# Patient Record
Sex: MALE | Race: OTHER | URBAN - METROPOLITAN AREA
[De-identification: names, ages, dates, MRNs, and addresses within clinical notes are randomized per-mention and may not be internally consistent; named-entity substitution may affect disease eponyms.]

---

## 2018-08-11 ENCOUNTER — OFFICE VISIT (OUTPATIENT)
Dept: FAMILY MEDICINE CLINIC | Age: 48
End: 2018-08-11

## 2018-08-11 VITALS
BODY MASS INDEX: 28.53 KG/M2 | TEMPERATURE: 98.6 F | WEIGHT: 161 LBS | SYSTOLIC BLOOD PRESSURE: 156 MMHG | HEART RATE: 82 BPM | HEIGHT: 63 IN | DIASTOLIC BLOOD PRESSURE: 89 MMHG

## 2018-08-11 DIAGNOSIS — Z13.9 ENCOUNTER FOR SCREENING: ICD-10-CM

## 2018-08-11 DIAGNOSIS — I10 ESSENTIAL HYPERTENSION: Primary | ICD-10-CM

## 2018-08-11 DIAGNOSIS — H66.003 ACUTE SUPPURATIVE OTITIS MEDIA OF BOTH EARS WITHOUT SPONTANEOUS RUPTURE OF TYMPANIC MEMBRANES, RECURRENCE NOT SPECIFIED: ICD-10-CM

## 2018-08-11 LAB — GLUCOSE POC: NORMAL MG/DL

## 2018-08-11 RX ORDER — AMLODIPINE BESYLATE 5 MG/1
5 TABLET ORAL DAILY
Qty: 90 TAB | Refills: 1 | Status: SHIPPED | OUTPATIENT
Start: 2018-08-11

## 2018-08-11 RX ORDER — AMOXICILLIN 875 MG/1
875 TABLET, FILM COATED ORAL 2 TIMES DAILY
Qty: 20 TAB | Refills: 0 | Status: SHIPPED | OUTPATIENT
Start: 2018-08-11 | End: 2018-08-21

## 2018-08-11 NOTE — PATIENT INSTRUCTIONS
Dieta DASH: Instrucciones de cuidado - [ DASH Diet: Care Instructions ]  Instrucciones de cuidado    La dieta DASH es un plan de alimentación que puede ayudar a bajar la presión arterial. DASH es la sigla en inglés de \"Dietary Approaches to Stop Hypertension\" (medidas dietéticas para disminuir la hipertensión). Hipertensión significa presión arterial don. La dieta DASH se concentra en el consumo de alimentos con alto contenido de calcio, potasio y [place]. Estos nutrientes pueden disminuir la presión arterial. Los alimentos con el mayor contenido de [place] nutrientes son las frutas, las verduras, los productos lácteos de bajo contenido de [place], las nueces, las semillas y las legumbres. Imelda beth suplementos de calcio, potasio y magnesio, en lugar de comer alimentos ricos en estos nutrientes, no tiene el mismo efecto. La dieta DASH también incluye granos integrales, pescado y aves. La dieta DASH es maciel de los cambios de estilo de joana que quizá le recomiende best médico para reducir la presión arterial don. Es posible que best médico también quiera que usted reduzca la cantidad de sodio en best [place]. Reducir el sodio mientras sigue la dieta DASH puede bajar la presión arterial incluso más que únicamente con la dieta DASH. La atención de seguimiento es cornelio parte clave de best tratamiento y seguridad. Asegúrese de hacer y acudir a todas las citas, y llame a best médico si está teniendo problemas. También es cornelio buena idea saber los resultados de chanelle exámenes y mantener cornelio lista de los medicamentos que catherine. ¿Cómo puede cuidarse en el hogar? Cómo seguir la dieta DASH  · Coma entre 4 y 5 porciones de fruta al día. Cornelio porción incluye 1 fruta de [place], ½ taza de fruta enlatada o cortada en trozos, 1/4 taza de fruta seca o 4 onzas (½ taza) de jugo de frutas. Elija fruta con más frecuencia que jugo. · Consuma entre 4 y 11 porciones de verduras al día.  Cornelio porción incluye 1 taza de [address] verduras de hojas crudas, ½ taza de verduras cocidas o cortadas en trozos, o 4 onzas (½ taza) de jugo de verduras. Elija comer verduras con más frecuencia que beth best jugo. · Consuma entre 2 y 3 porciones de lácteos semidescremados y descremados al día. Cornelio porción incluye 8 onzas de [place], 1 taza de yogur o 1½ onzas de [place]. · Coma entre 6 y 6 porciones de granos todos los [address] Cornelio porción incluye 1 rebanada de pan, 1 onza de cereal seco, o ½ taza de arroz, pasta o cereal cocido. Trate de elegir productos de grano integral con la mayor frecuencia posible. · Limite la cantidad de [place] aves y pescado a 2 porciones al día. Ru Arlene porción son 3 onzas, lo que es aproximadamente el tamaño de un rolando de naipes. · Coma entre 4 y 5 porciones de nueces, semillas y legumbres (frijoles [habichuelas], lentejas y arvejas [chícharos] secos y cocidos) a la semana. Cornelio porción incluye 1/3 taza de nueces, 2 cucharadas de semillas o ½ taza de frijoles o arvejas cocidos. · [address] y aceites a 2 o 3 porciones al día. Cornelio porción es 1 cucharadita de aceite vegetal o 2 cucharadas de aderezo para ensaladas. · Limite los dulces y el azúcar adicional a 5 porciones o menos por semana. Ru Arlene porción es 1 cucharada de [place], ½ taza de sorbete o 1 taza de limonada. · Consuma menos de 2,300 miligramos (mg) de sodio al día. Si limita best consumo de sodio a 1,500 mg al día, puede reducir best presión arterial aún más. Consejos para tener éxito  · Inicie con cambios pequeños. No intente hacer cambios radicales en best dieta de manera repentina. Podría sentir que extraña chanelle comidas favoritas y, por lo [place], venkatesh cornelio mayor probabilidad de que no cumpla el plan. Mary Alice Dejesus. Gemini Bradshawese que esos cambios se conviertan en un hábito, incorpore algunos [employer]. · Pruebe algo de lo siguiente:  ¨ Fíjese el objetivo de comer cornelio porción de fruta o de verduras en todas las comidas y los refrigerios.  Beatrice facilitará alcanzar la cantidad diaria recomendada de frutas y verduras. ¨ Pruebe comer yogur cubierto con frutas frescas y nueces dimitri refrigerio o dimitri postre saludable. ¨ Agregue jade, tomate, pepino y cebolla a chanelle sándwiches. ¨ Combine cornelio masa de pizza precocida con queso mozzarella de bajo contenido de grasa (\"low-fat\") y cúbralo con abundantes verduras. Intente utilizar tomates, calabaza, espinaca, brócoli, zanahorias, coliflor y cebollas. ¨ Opte por comer cornelio variedad de verduras cortadas en trozos con un aderezo de bajo contenido de grasa dimitri refrigerio, en lugar de \"chips\" (tipo meg fritas) y un \"dip\" (producto untable). ¨ [place] semillas de girasol o almendras picadas [employer]. O intente agregar nueces o almendras picadas a las verduras cocidas. ¨ Pruebe algunas comidas vegetarianas con frijoles y arvejas. [place] o frijoles rojos a las ensaladas. Prepare burritos y tacos con puré de frijoles pintos o negros. ¿Dónde puede encontrar más información en inglés? Derrick Pettit a [email or web address]-glenroy.info/. [place] L767 en la búsqueda para aprender más acerca de \"Dieta DASH: Instrucciones de cuidado - [ DASH Diet: Care Instructions ]. \"  Revisado: 6 dicrufinambre, 2017  Versión del contenido: 11.7  © 7086-4397 [employer]. Las instrucciones de cuidado fueron adaptadas bajo licencia por [employer] (which disclaims liability or warranty for this information). Si usted tiene [place] afección médica o sobre estas instrucciones, siempre pregunte a best profesional de rafqi. [facility] niega toda garantía o responsabilidad por best uso de esta información. Infección del oído (otitis media): Instrucciones de cuidado - [ Ear Infection (Otitis Media): Care Instructions ]  Instrucciones de cuidado    Cornelio infección de oído podría comenzar con un resfriado y afectar el oído medio (otitis media). Puede doler mucho.  [place] Beat de las infecciones de oído sanan por sí solas en un par de días. A menudo, no se necesitan antibióticos. La razón es que muchas infecciones de oído están causadas por virus. Los antibióticos no funcionan contra los virus. Las dosis regulares de analgésicos (medicamentos para el dolor) son la mejor manera de reducir la fiebre y ayudarle a sentirse mejor. La atención de seguimiento es cornelio parte clave de akbar tratamiento y seguridad. Asegúrese de hacer y acudir a todas las citas, y llame a akbar médico si está teniendo problemas. También es cornelio buena idea saber los resultados de chanelle exámenes y mantener cornelio lista de los medicamentos que catherine. ¿Cómo puede cuidarse en el hogar? · [employer] analgésicos exactamente dimitri le fueron indicados. ¨ Si el médico le recetó un analgésico, tómelo según las indicaciones. ¨ Si no está tomando un analgésico recetado, tome maciel de venta keshia, dimitri acetaminofén (Tylenol), ibuprofeno (Advil, Motrin) o naproxeno (Aleve). Tierney y siga todas las instrucciones de la [place]. ¨ No tome dos o más analgésicos al mismo tiempo a menos que el médico se lo haya indicado. Muchos analgésicos contienen acetaminofén, es decir, Tylenol. El exceso de acetaminofén (Tylenol) puede ser dañino. · Planee tomarse cornelio dosis completa de analgésico antes de acostarse. Dormir lo suficiente le ayudará a sentirse mejor. · Pruebe con cornelio toallita tibia húmeda en el oído. Reginald vez le ayude a aliviar el dolor. · Si akbar médico le recetó antibióticos, tómelos según las indicaciones. No deje de tomarlos por el hecho de sentirse mejor. Debe beth todos los antibióticos hasta terminarlos. ¿Cuándo debe pedir ayuda?   Llame a akbar médico ahora mismo o busque atención médica inmediata si:    · Tiene dolor de oído nuevo o que aumenta.     · Akbar oído presenta secreción de pus o adriana nueva o que está aumentando.     · Tiene fiebre junto con rigidez en el shawna o un dolor de gricelda intenso.    Preste especial atención a los cambios en best rafiq y asegúrese de comunicarse con best médico si:    · Tiene síntomas nuevos o que empeoran.     · No mejora después de venkatesh tomado un antibiótico bakari 2 días. ¿Dónde puede encontrar más información en inglés? Tiarra López a http://kevyn-glenroy.[email or web address] Hever Gip en la búsqueda para aprender más acerca de \"Infección del oído (otitis media): Instrucciones de cuidado - [ Ear Infection (Otitis Media): Care Instructions ]. \"  Revisado: 12 ying, 2017  Versión del contenido: 11.7  © 4505-0578 [employer]wise, Incorporated. Las instrucciones de cuidado fueron adaptadas bajo licencia por [employer] (which disclaims liability or warranty for this information). Si usted tiene [place] afección médica o sobre estas instrucciones, siempre pregunte a best profesional de rafiq. [employer], Incorporated niega toda garantía o responsabilidad por best uso de esta información.

## 2018-08-11 NOTE — PROGRESS NOTES
Patricia Andrews is a 52 y.o. male    Issues discussed today include:    Chief Complaint   Patient presents with    Other     Blood Pressure and diabetes.  Physical       1) Abnormal BP:  Was sent from his work after physical revealed high BP and sugar in the urine. They were also concerned about ear infections. Pt brought paper from them indicating all of the above. Pt does endorse some pain and itching in both ears. He denies CP, SOB, leg swelling. No previous dx of HTN or DM. Says he father passed away in [facility] in March 2018 and he has been feeling sad, distracted, stressed since then. Works in cement, lots of dust.   ot taking any meds currently. No tobacco use. Drinks on the weekends. Data reviewed or ordered today:       Other problems include: There is no problem list on file for this patient. Medications:  No current outpatient prescriptions on file prior to visit. No current facility-administered medications on file prior to visit. Allergies:  No Known Allergies    LMP:  No LMP for male patient. Social History     Social History    Marital status:      Spouse name: N/A    Number of children: N/A    Years of education: N/A     Occupational History    Not on file. Social History Main Topics    Smoking status: Former Smoker    Smokeless tobacco: Never Used    Alcohol use 7.2 oz/week     12 Cans of beer per week      Comment: on the weekends    Drug use: No    Sexual activity: Not on file     Other Topics Concern    Not on file     Social History Narrative    No narrative on file       No family history on file.       Physical Exam   Visit Vitals    /89 (BP 1 Location: Left arm)    Pulse 82    Temp 98.6 °F (37 °C) (Oral)    Ht 5' 2.99\" (1.6 m)    Wt 161 lb (73 kg)    BMI 28.53 kg/m2      BP Readings from Last 3 Encounters:   08/11/18 156/89     Constitutional: Appears well,  No acute distress, Vitals noted  Psychiatric:  Affect normal, Alert and Oriented to person/place/time  Eyes:  Conjunctiva clear, no drainage  ENT:  External ears and nose normal, Mucous membranes moist  Neck:  General inspection normal. Supple. Heart:  Normal HR, Normal S1 and S2,  Regular rhythm. No murmurs, rubs or gallops. Lungs:  Clear to auscultation, good respiratory effort, no wheezes, rales or rhonchi  Extremities: Without edema, good peripheral pulses  Skin:  Warm to palpation, without rashes      Lab Results   Component Value Date/Time    Glucose POC nf 146 08/11/2018 02:32 PM         Assessment/Plan:      ICD-10-CM ICD-9-CM    1. Essential hypertension I10 401.9 amLODIPine (NORVASC) 5 mg tablet      CBC W/O DIFF      HEMOGLOBIN A1C WITH EAG      METABOLIC PANEL, COMPREHENSIVE      MICROALBUMIN, UR, RAND W/ MICROALB/CREAT RATIO      LIPID PANEL   2. Acute suppurative otitis media of both ears without spontaneous rupture of tympanic membranes, recurrence not specified H66.003 382.00 amoxicillin (AMOXIL) 875 mg tablet   3. Encounter for screening Z13.9 V82.9 AMB POC GLUCOSE BLOOD, BY GLUCOSE MONITORING DEVICE       BP elevated today. POC glucose ok, reported glucosuria at work  Will start tx for HTN with norvasc 5mg daily - kidney neutral bc no Cr on file and pt unsure when he can get to [place] for testing, lives and works in [employer] ordered for future collection  Bilat AOM today, rx for amoxil sent  Good rx provided for all meds      Follow-up Disposition:  Return for 1-2 week for lab appt; provider appt in 3 months.         Marylene Palin, MD  [address] [place] [address]

## 2018-08-11 NOTE — MR AVS SNAPSHOT
[address] [place] [address] 
[phone] Patient: Jeffrey Platt MRN: [id number] :1970 Visit Information Rohit Pak Personal Médico Departamento Teléfono del Dep. Número de visita 2018  2:45 PM Cayden Mckeon MD [address] [phone] Follow-up Instructions Return for 1-2 week for lab appt; provider appt in 3 months. Upcoming Health Maintenance Date Due DTaP/Tdap/Td series (1 - Tdap) 1991 Influenza Age 5 to Adult 2018 Alergias  Review Complete El: 2018 Por: Cayden Mckeon MD  
 A partir del:  2018 No Known Allergies Vacunas actuales Laila Shoulders No hay ninguna vacuna archivada. No revisadas esta visita You Were Diagnosed With   
  
 Clent Oas Encounter for screening    -  Primary ICD-10-CM: Z13.9 ICD-9-CM: V82.9 Partes vitales PS Pulso Temperatura [place] ( percentil de crecimiento) Peso (percentil de crecimiento) BMI (IMC)  
 156/89 (BP 1 Location: Left arm) 82 98.6 °F (37 °C) (Oral) 5' 2.99\" (1.6 m) 161 lb (73 kg) 28.53 kg/m2 Estatus de tabaquísmo Former Smoker Historial de signos vitales BMI and BSA Data Body Mass Index Body Surface Area 28.53 kg/m 2 1.8 m 2 [facility] Name Phone [facility] #[id number],[address] [place] [address] [phone] Akbar lista de medicamentos actualizada Rafael  actualizada 18  4:17 PM.  Lorena Brandyn use akbar lista de medicamentos más reciente. amLODIPine 5 mg tablet También conocido dimitri:  Benita Citron Take 1 Tab by mouth daily. For blood pressure. [place] 1 tableta cornelio vez al bina para presion arterial  
  
 amoxicillin 875 mg tablet También conocido dimitri:  AMOXIL Take 1 Tab by mouth two (2) times a day for 10 days.  [address] veces al bina x 10 wall para infecion Recetas Enviado a la [place] Refills  
 amLODIPine (NORVASC) 5 mg tablet 1 Sig: Take 1 Tab by mouth daily. For blood pressure. [place] 1 tableta cornelio vez al bina para presion arterial  
 Class: Normal  
 Pharmacy: [facility] [address], [address] [place] [address] Ph #: [phone] Route: Oral  
 amoxicillin (AMOXIL) 875 mg tablet 0 Sig: Take 1 Tab by mouth two (2) times a day for 10 days. [place] 1 tableta dos veces al bina x 10 wall para infecion Class: Normal  
 Pharmacy: [facility]pharmacy [address] [place] [address] Ph #: [phone] Route: Oral  
  
Hicimos lo siguiente AMB POC GLUCOSE BLOOD, BY GLUCOSE MONITORING DEVICE [87176 CPT(R)] Instrucciones de seguimiento Return for 1-2 week for lab appt; provider appt in 3 months. Por hacer 08/11/2018 Lab:  CBC W/O DIFF   
  
 08/11/2018 Lab:  HEMOGLOBIN A1C WITH EAG   
  
 08/11/2018 Lab:  LIPID PANEL   
  
 08/11/2018 Lab:  METABOLIC PANEL, COMPREHENSIVE   
  
 08/11/2018 Lab:  MICROALBUMIN, UR, RAND W/ MICROALB/CREAT RATIO Instrucciones para el [place] Dieta DASH: Instrucciones de cuidado - [ DASH Diet: Care Instructions ] Instrucciones de cuidado La dieta DASH es un plan de alimentación que puede ayudar a bajar la presión arterial. DASH es la sigla en inglés de \"Dietary Approaches to Stop Hypertension\" (medidas dietéticas para disminuir la hipertensión). Hipertensión significa presión arterial don. La dieta DASH se concentra en el consumo de alimentos con alto contenido de calcio, potasio y [place]. Estos nutrientes pueden disminuir la presión arterial. Los alimentos con el mayor contenido de [place] nutrientes son las frutas, las verduras, los productos lácteos de bajo contenido de [place], las nueces, las semillas y las legumbres.  Imelda beth suplementos de calcio, potasio y magnesio, en lugar de comer alimentos ricos en estos nutrientes, no tiene el [facility] HospitalTerascala IMScouting. La dieta DASH también incluye granos integrales, pescado y aves. La dieta DASH es maciel de los cambios de estilo de joana que quizá le recomiende best médico para reducir la presión arterial don. Es posible que best médico también quiera que usted reduzca la cantidad de sodio en best Aubery Cambric. Reducir el sodio mientras sigue la dieta DASH puede bajar la presión arterial incluso más que únicamente con la dieta DASH. La atención de seguimiento es cornelio parte clave de best tratamiento y seguridad. Asegúrese de hacer y acudir a todas las citas, y llame a best médico si está teniendo problemas. También es cornelio buena idea saber los resultados de chanelle exámenes y mantener cornelio lista de los medicamentos que catherine. Cómo puede cuidarse en el hogar? Cómo seguir la dieta DASH 
· Coma entre 4 y 5 porciones de fruta al día. Cornelio porción incluye 1 fruta de [place], ½ taza de fruta enlatada o cortada en trozos, 1/4 taza de fruta seca o 4 onzas (½ taza) de jugo de frutas. Elija fruta con más frecuencia que jugo. · Consuma entre 4 y 11 porciones de verduras al día. Cornelio porción incluye 1 taza de Devika o de verduras de hojas crudas, ½ taza de verduras cocidas o cortadas en trozos, o 4 onzas (½ taza) de jugo de verduras. Elija comer verduras con más frecuencia que beth best jugo. · Consuma entre 2 y 3 porciones de lácteos semidescremados y descremados al día. Cornelio porción incluye 8 onzas de [place], 1 taza de yogur o 1½ onzas de Krysten-barre. · Coma entre 6 y 6 porciones de granos todos los [address] Cornelio porción incluye 1 rebanada de pan, 1 onza de cereal seco, o ½ taza de arroz, pasta o cereal cocido. Trate de elegir productos de grano integral con la mayor frecuencia posible. · Limite la cantidad de [place]), aves y pescado a 2 porciones al día. Prudence Drone porción son 3 onzas, lo que es aproximadamente el tamaño de un rolando de naipes. · Coma entre 4 y 5 porciones de nueces, semillas y legumbres (frijoles [habichuelas], lentejas y arvejas [chícharos] secos y cocidos) a la semana. Cornelio porción incluye 1/3 taza de nueces, 2 cucharadas de semillas o ½ taza de frijoles o arvejas cocidos. · [address] y aceites a 2 o 3 porciones al día. Cornelio porción es 1 cucharadita de aceite vegetal o 2 cucharadas de aderezo para ensaladas. · Limite los dulces y el azúcar adicional a 5 porciones o menos por semana. [place] porción es 1 cucharada de [place] o [place], ½ taza de sorbete o 1 taza de limonada. · Consuma menos de 2,300 miligramos (mg) de sodio al día. Si limita best consumo de sodio a 1,500 mg al día, puede reducir best presión arterial aún más. Consejos para tener éxito · Inicie con cambios pequeños. No intente hacer cambios radicales en best dieta de manera repentina. Podría sentir que extraña chanelle comidas favoritas y, por lo [place], venkatesh cornelio mayor probabilidad de que no cumpla el plan. Trish Schirmer y [place]. Jessica Emms que esos cambios se conviertan en un hábito, incorpore algunos [employer]. · Pruebe algo de lo siguiente: 
¨ Fíjese el objetivo de comer cornelio porción de fruta o de verduras en todas las comidas y los refrigerios. [place] facilitará alcanzar la cantidad diaria recomendada de frutas y verduras. ¨ Pruebe comer yogur cubierto con frutas frescas y nueces dimitri refrigerio o dimitri postre saludable. ¨ Agregue jade, tomate, pepino y cebolla a chanelle sándwiches. ¨ Combine cornelio masa de pizza precocida con queso mozzarella de bajo contenido de grasa (\"low-fat\") y cúbralo con abundantes verduras. Intente utilizar tomates, calabaza, espinaca, brócoli, zanahorias, coliflor y cebollas. ¨ Opte por comer cornelio variedad de verduras cortadas en trozos con un aderezo de bajo contenido de grasa dimitri refrigerio, en lugar de \"chips\" (tipo meg fritas) y un \"dip\" (producto untable). ¨ [place] semillas de girasol o almendras picadas [employer]. O intente agregar nueces o almendras picadas a las verduras cocidas. ¨ Pruebe algunas comidas vegetarianas con frijoles y arvejas. Deniz Gurney o frijoles rojos a las ensaladas. Prepare burritos y tacos con puré de frijoles pintos o negros. Dónde puede encontrar más información en inglés? Isaiah Labella a [email or web address] [facility] O856 en la búsqueda para aprender más acerca de \"Dieta DASH: Instrucciones de cuidado - [ DASH Diet: Care Instructions ]. \" 
Revisado: 6 diciembre, 2017 Versión del contenido: 11.7 © 9736-9142 Healthwise, Incorporated. Las instrucciones de cuidado fueron adaptadas bajo licencia por Good [employer] (which disclaims liability or warranty for this information). Si usted tiene [place] afección médica o sobre estas instrucciones, siempre pregunte a best profesional de rafiq. Healthwise, Incorporated niega toda garantía o responsabilidad por best uso de esta información. Infección del oído (otitis media): Instrucciones de cuidado - [ Ear Infection (Otitis Media): Care Instructions ] Instrucciones de cuidado Cornelio infección de oído podría comenzar con un resfriado y afectar el oído medio (otitis media). Puede doler mucho. La mayoría de las infecciones de oído sanan por sí solas en un par de días. A menudo, no se necesitan antibióticos. La razón es que muchas infecciones de oído están causadas por virus. Los antibióticos no funcionan contra los virus. Las dosis regulares de analgésicos (medicamentos para el dolor) son la mejor manera de reducir la fiebre y ayudarle a sentirse mejor. La atención de seguimiento es cornelio parte clave de best tratamiento y seguridad. Asegúrese de hacer y acudir a todas las citas, y llame a best médico si está teniendo problemas. También es cornelio buena idea saber los resultados de chanelle exámenes y mantener cornelio lista de los medicamentos que catherine. Cómo puede cuidarse en el hogar? · [employer] analgésicos exactamente dimitri le fueron indicados. ¨ Si el médico le recetó un analgésico, tómelo según las indicaciones. ¨ Si no está tomando un analgésico recetado, tome maciel de venta keshia, dimitri acetaminofén (Tylenol), ibuprofeno (Advil, Motrin) o naproxeno (Aleve). Tierney y siga todas las instrucciones de la [place]. ¨ No tome dos o más analgésicos al mismo tiempo a menos que el médico se lo haya indicado. Muchos analgésicos contienen acetaminofén, es decir, Tylenol. El exceso de acetaminofén (Tylenol) puede ser dañino. · Planee tomarse cornelio dosis completa de analgésico antes de acostarse. Dormir lo suficiente le ayudará a sentirse mejor. · Pruebe con cornelio toallita tibia húmeda en el oído. Reginald vez le ayude a aliviar el dolor. · Si akbar médico le recetó antibióticos, tómelos según las indicaciones. No deje de tomarlos por el hecho de sentirse mejor. Debe beth todos los antibióticos hasta terminarlos. Cuándo debe pedir ayuda? Llame a akbar médico ahora mismo o busque atención médica inmediata si: 
  · Tiene dolor de oído nuevo o que aumenta.  
  · Akbar oído presenta secreción de pus o adriana nueva o que está aumentando.  
  · Tiene fiebre junto con rigidez en el shawna o un dolor de gricelda intenso.  
 Preste especial atención a los cambios en akbar rafiq y asegúrese de comunicarse con akbar médico si: 
  · Tiene síntomas nuevos o que empeoran.  
  · No mejora después de venkatesh tomado un antibiótico bakari 2 días. Dónde puede encontrar más información en inglés? James Fang a [email or web address] [place] en la búsqueda para aprender más acerca de \"Infección del oído (otitis media): Instrucciones de cuidado - [ Ear Infection (Otitis Media): Care Instructions ]. \" 
Revisado: 12 mayo, 2017 Versión del contenido: 11.7 © 5424-7335 [employer] Inc[employer].  Las instrucciones de cuidado fueron adaptadas bajo licencia por Good [employer] Conne[employer] (which disclaims liability or warranty for this information). Si usted tiene [place] afección médica o sobre estas instrucciones, siempre pregunte a best profesional de rafiq. [facility], Incorporated niega toda garantía o responsabilidad por best uso de esta información. Introducing [facility]! [facility] introduce portal paciente MyChart . Ahora se puede acceder a partes de best expediente médico, enviar por correo electrónico la oficina de best médico y solicitar renovaciones de medicamentos en línea. En best navegador de Internet , Kendall Booker a [email or web address] Nathan clic en el usuario por Gela Davis? Ronald Sotelo clic aquí en la sesión Deborha Majors. Verá la página de registro [place]. Ingrese best código de [employer] tay y dimitri aparece a continuación. Usted no tendrá que [employer] código después de venkatesh completado el proceso de registro . Si usted no se inscribe antes de la fecha de caducidad , debe solicitar un nuevo código. · MyChart Código de acceso : [id number] Expires: 11/9/2018  4:03 PM 
 
Ingresa los últimos cuatro dígitos de best Número de Seguro Social ( xxxx ) y fecha de nacimiento ( dd / mm / aaaa ) dimitri se indica y nathan clic en Enviar. ted será llevado a la siguiente página de registro . Crear un ID MyChart . Esta será best ID de inicio de sesión de MyChart y no puede ser [place] , por lo que pensar en cornelio que es [place] y fácil de recordar . Crear cornelio contraseña MyChart . ted puede cambiar best contraseña en cualquier momento . Ingrese best Password Reset de preguntas y [place] . [place] se puede utilizar en un momento posterior si usted olvida best contraseña. Introduzca best dirección de correo electrónico . Rozina Villalobos recibirá cornelio notificación por correo electrónico cuando la nueva información está disponible en MyChart . Beau Nisaer cldonald en Registrarse.  Chevy Cason saritha y descargar porciones de best expediente [employer] clic en el enlace de descarga del menú Resumen para descargar cornelio copia portátil de best información médica . Si tiene [employer] , por favor visite la sección de preguntas frecuentes del sitio web MyChart . Recuerde, MyChart NO es que se utilizará para las necesidades urgentes. Para emergencias médicas , llame al 911 . Ahora disponible en best iPhone y Android ! Por favor proporcione ana lilia resumen de la documentación de cuidado a best próximo proveedor. If you have any questions after today's visit, please call [phone].

## 2018-08-11 NOTE — PROGRESS NOTES
Results for orders placed or performed in visit on 08/11/18   AMB POC GLUCOSE BLOOD, BY GLUCOSE MONITORING DEVICE   Result Value Ref Range    Glucose POC nf 146 mg/dL

## 2019-05-20 ENCOUNTER — TELEPHONE (OUTPATIENT)
Dept: FAMILY MEDICINE CLINIC | Age: 49
End: 2019-05-20

## 2019-05-20 NOTE — TELEPHONE ENCOUNTER
Patient has an upcoming appt. For labs only, no lab orders on file. It seems that Lab orders were dc due to that patient never return to clinic to get them done. Patient LOV 08/2018. Dr. Efren Escoto can you order labs for patient, per chart review he has an upcoming appt. With Dr. Sonya Martinez on 5/29/19.